# Patient Record
Sex: MALE | Race: WHITE | NOT HISPANIC OR LATINO | URBAN - METROPOLITAN AREA
[De-identification: names, ages, dates, MRNs, and addresses within clinical notes are randomized per-mention and may not be internally consistent; named-entity substitution may affect disease eponyms.]

---

## 2017-01-04 ENCOUNTER — ALLSCRIPTS OFFICE VISIT (OUTPATIENT)
Dept: OTHER | Facility: OTHER | Age: 57
End: 2017-01-04

## 2017-01-04 DIAGNOSIS — E11.65 TYPE 2 DIABETES MELLITUS WITH HYPERGLYCEMIA (HCC): ICD-10-CM

## 2017-01-04 DIAGNOSIS — E78.5 HYPERLIPIDEMIA: ICD-10-CM

## 2018-01-09 NOTE — MISCELLANEOUS
Message  asked him to discuss his ? about changing insulin(24 hr long acting) with endo  told him to decrease oxycodone for his neuropathy   told him to continue with neuro as they instructed the increase in his gabapentine      Plan  Diabetes mellitus type 2 with neurological manifestations    · Januvia 100 MG Oral Tablet; take 1 tablet by mouth once daily    Signatures   Electronically signed by : Cassie Alanis DO; Aug 18 2016  8:31AM EST                       (Author)

## 2018-01-10 NOTE — PROCEDURES
Results/Data    Procedure: Nerve Conduction Study  Indication: Right Upper and Lower Extremities   Referred by Dr Bonnie Bond   The procedure's were discussed with the patient  Written consent was obtained prior to the procedure and is detailed in the patient's record  Prior to the start of the procedure a time out was taken and the identity of the patient was confirmed via name and date of birth with the patient  The correct site and the procedure to be performed were confirmed  The correct side was confirmed if applicable  The positioning of the patient was verified  The availability of the correct equipment was verified  Procedure Start Time: 4:10   Procedure Completion Time: 4:45   Technique: A sterile concentric needle electrode was used  The patient tolerated the procedure well  There were no complications  Results  EMG RIGHT/UPPER/LOWER EXTREMITY    Motor and sensory conduction studies were performed on the right median, ulnar, peroneal, tibial and sural nerves  The distal motor latencies were normal  The motor action potential amplitudes were reduced in the peroneal and tibial nerves and normal in the median and ulnar nerves  Motor conduction velocities were prolonged in the median nerve at 46 m/s, in the ulnar nerve below the elbow at 39 m/s, the tibial nerve at 32 m/s in the peroneal nerve below the fibular head at 33 m/s with normal conduction velocity of the ulnar nerve across the elbow and peroneal nerve across the fibular head  F waves were not obtainable in the lower extremities and prolonged in the upper extremities    The right sural distal sensory latency was not obtainable  The median and ulnar distal sensory latencies were normal with normal palmar conduction with median stimulation  Action potential amplitudes were normal     H  reflexes were unobtainable      IMPRESSION: This is an abnormal nerve conduction study due to changes consistent with a mixed motor sensory polyneuropathy with findings suggestive of both axonal and demyelinative change    LOENEL Martins        Signatures   Electronically signed by : Dakotah Bass MD; Jun 30 2016  9:34AM EST                       (Author)

## 2018-01-11 NOTE — MISCELLANEOUS
Message  spoke with sister about his pt/inr  his sister states that dr Lencho Metcalf is handling the coumadin      Signatures   Electronically signed by : Josh Gray DO; May 24 2016  8:35AM EST                       (Author)

## 2018-01-11 NOTE — MISCELLANEOUS
Message  PT CALLED REQUESTING MED REFILLS  ADVISED MESSAGES ARE IN THE SYSTEM FOR DR ELIE MORALES THAT NARCOTICS WERE NOT FILLED ON 4/11/16  ADVISED DOCTOR WAS NOT IN THE OFFICE YESTERDAY  TOOK ADDITIONAL MESSAGE AND OFFERED MULTIPLE APPOINTMENTS WITH OTHER PROVIDERS  PT REFUSED  Active Problems    1  Congestive heart failure (428 0) (I50 9)   2  Coronary artery disease (414 00) (I25 10)   3  Deep vein thrombosis (453 40) (I82 409)   4  Diabetic autonomic neuropathy associated with diabetes mellitus due to underlying   condition (249 60,337 1) (E08 43)   5  Edema (782 3) (R60 9)   6  GERD (gastroesophageal reflux disease) (530 81) (K21 9)   7  Hypertension (401 9) (I10)   8  Neuropathy (355 9) (G62 9)   9  Persistent atrial fibrillation (427 31) (I48 1)    Current Meds   1  Acetaminophen 500 MG Oral Tablet; Therapy: (Recorded:04Vsg9970) to Recorded   2  Aspir-81 TBEC; Therapy: (Ezio Toledo) to Recorded   3  Atorvastatin Calcium 40 MG Oral Tablet; Take 1 tablet daily; Therapy: 77SUS5894 to (Zoe Scott)  Requested for: 45TMJ8506; Last   Rx:28Wjr4712 Ordered   4  Carvedilol 3 125 MG Oral Tablet; TAKE 1 TABLET TWICE DAILY,  WITH MORNING AND   EVENING MEAL  Requested for: 79Ncj6097; Last Rx:24Btm0049 Ordered   5  Digoxin 125 MCG Oral Tablet; Take 1 tablet by mouth  every day  Requested for:   79Irz6494; Last Rx:36Mgm9958 Ordered   6  Gabapentin 600 MG Oral Tablet; TAKE 1 TABLET 3 TIMES DAILY; Therapy: 66Ksc4071 to (Evaluate:36Bwb5096)  Requested for: 58Wxg7005; Last   Rx:61Uxt0326 Ordered   7  HumaLOG KwikPen 100 UNIT/ML Subcutaneous Solution Pen-injector; INJECT   SUBCUTANEOUSLY AS DIRECTED; Therapy: 03Ohn4315 to (Zully Crouch)  Requested for: 98Udl5595; Last   Rx:[id number]; Status: ACTIVE - Renewal Voided Ordered   8  HydrALAZINE HCl - 50 MG Oral Tablet; TAKE 1 TABLET 3 TIMES DAILY  Requested for:   08Pnt2178; Last Rx:47Uif8517 Ordered   9   Insulin Glargine 100 UNIT/ML SOLN;   Therapy: (Liss Falcon) to Recorded   10  Insulin Lispro (Human) 100 UNIT/ML SOLN;    Therapy: (Recorded:64Iri9580) to Recorded   11  Isosorbide Dinitrate 20 MG Oral Tablet; TAKE 1 TABLET EVERY 8 HOURS DAILY; Therapy: 76Znn5117 to (Evaluate:52Sel7110)  Requested for: 55Nvy7551; Last    Rx:79Sqx5983 Ordered   12  Lantus SoloStar 100 UNIT/ML Subcutaneous Solution Pen-injector; INJECT    SUBCUTANEOUSLY AS DIRECTED; Therapy: 34Awn8216 to (Lilyan Cranker)  Requested for: 19Mvh2865; Last    Rx:39Psq0794 Ordered   13  Nitrostat 0 4 MG Sublingual Tablet Sublingual; PLACE 1 TABLET UNDER THE TONGUE    EVERY 5 MINUTES FOR UP TO 3 DOSES AS NEEDED FOR CHEST PAIN  CALL    911 IF PAIN PERSISTS; Therapy: 67YFM6743 to (James Wilde)  Requested for: 64AMX3294; Last    Rx:45Fjv9815 Ordered   15  Omeprazole 20 MG Oral Capsule Delayed Release; TAKE 1 CAPSULE DAILY EVERY    MORNING BEFORE BREAKFAST; Therapy: 45Kil8545 to (Evaluate:06Yqj5822)  Requested for: 91Erc7291; Last    Rx:24Jtj2185 Ordered   15  Potassium Chloride Sherine ER 20 MEQ Oral Tablet Extended Release; TAKE 2 TABLETS    DAILY  Requested for: 50SDS6229; Last Rx:84Bxg3644 Ordered   16  Spironolactone 25 MG Oral Tablet; TAKE 1 TABLET TWICE DAILY  Requested for:    04PLO7790; Last Rx:72Qlg1495 Ordered   17  Torsemide 20 MG Oral Tablet; take 2 tablet twice daily; Last Rx:55Pgp4488 Ordered   18  Warfarin Sodium 3 MG Oral Tablet; TAKE 1 TABLET DAILY AS DIRECTED  Requested    for: 74Kgw8298; Last Rx:35Mky8202 Ordered    Allergies    1  Lipitor    Signatures   Electronically signed by : Kenneth Sheehan OM;  Apr 12 2016  1:40PM EST                       (Author)

## 2018-01-13 VITALS
TEMPERATURE: 98.2 F | SYSTOLIC BLOOD PRESSURE: 130 MMHG | HEART RATE: 86 BPM | DIASTOLIC BLOOD PRESSURE: 78 MMHG | WEIGHT: 254.85 LBS | BODY MASS INDEX: 37.75 KG/M2 | HEIGHT: 69 IN

## 2018-01-15 NOTE — MISCELLANEOUS
Message  Message Free Text Note Form: Spoke with patient regarding blood sugars, decrease Januvia to 50 mg daily due to decreased GFR, due to hyperglycemia, increase Novolog to 20 units before 1-2-3 meals plus 1 unit per 50 over 150 and continue Lantus 40 units at bedtime  Continue with blood sugar checks and send in a log in 2 weeks  Always have glucose available for hypoglycemia  Contact office with any issues  Plan    1  From  Januvia 100 MG Oral Tablet take 1 tablet by mouth once daily To Januvia   100 MG Oral Tablet take 1/2 tablet by mouth once daily   2   From  NovoLOG FlexPen 100 UNIT/ML Subcutaneous Solution Pen-injector 15   units SC before each meal up to three times a day plus   1 unit per 50 over 150, hold if not eating To NovoLOG FlexPen 100 UNIT/ML   Subcutaneous Solution Pen-injector 20 units SC before each meal up to three times a   day plus 1 unit per 50 over 150, hold if not eating    Signatures   Electronically signed by : Bandar Jean; Dec  6 2016  2:01PM EST                       (Author)    Electronically signed by : LEONEL De La Rosa ; Dec  8 2016  3:29PM EST                       (Review)

## 2018-01-15 NOTE — MISCELLANEOUS
Message  pt had a nose bld x 3 yesterday  rec check pt/inr  his ear is blocked - if pt/inr are okay will tx with amox for uri  also his bld gluc is slowing improving but told him did not like his fasting and will try [place] [facility] [place]  his crt is slightly high  if [place] [facility] [place] too expensive will try metformen low dose and watch crt        Signatures   Electronically signed by : Darshana Gutierrez DO; May  9 2016  3:51PM EST                       (Author)

## 2018-01-16 NOTE — MISCELLANEOUS
Message  called pt  his pt/inr is not prolonged told him to take 3- 3mg tabs x 3 days then 2 daily and 3tabs daily on monday and friday        Plan  Acute URI    · Amoxicillin 500 MG Oral Tablet; TAKE 1 TABLET EVERY 8 HOURS DAILY    Signatures   Electronically signed by : Cem Levi DO; May 11 2016 10:47AM EST                       (Author)

## 2021-01-10 DIAGNOSIS — K21.00 GASTROESOPHAGEAL REFLUX DISEASE WITH ESOPHAGITIS WITHOUT HEMORRHAGE: Primary | ICD-10-CM

## 2021-01-11 RX ORDER — PANTOPRAZOLE SODIUM 40 MG/1
TABLET, DELAYED RELEASE ORAL
Qty: 60 TABLET | Refills: 0 | Status: SHIPPED | OUTPATIENT
Start: 2021-01-11

## 2023-02-20 ENCOUNTER — TELEPHONE (OUTPATIENT)
Dept: GASTROENTEROLOGY | Facility: CLINIC | Age: 63
End: 2023-02-20